# Patient Record
Sex: MALE | Race: WHITE | NOT HISPANIC OR LATINO | Employment: OTHER | URBAN - METROPOLITAN AREA
[De-identification: names, ages, dates, MRNs, and addresses within clinical notes are randomized per-mention and may not be internally consistent; named-entity substitution may affect disease eponyms.]

---

## 2017-01-13 ENCOUNTER — TELEPHONE (OUTPATIENT)
Dept: UROLOGY | Facility: CLINIC | Age: 52
End: 2017-01-13

## 2017-01-13 NOTE — TELEPHONE ENCOUNTER
Spoke with pharmacy . Pt insurance will pay for the androgel 1.62% two pumps a day. Rx verbally changed ok per Shruthi Corey NP

## 2017-04-12 ENCOUNTER — OFFICE VISIT (OUTPATIENT)
Dept: UROLOGY | Facility: CLINIC | Age: 52
End: 2017-04-12

## 2017-04-12 ENCOUNTER — TELEPHONE (OUTPATIENT)
Dept: UROLOGY | Facility: CLINIC | Age: 52
End: 2017-04-12

## 2017-04-12 ENCOUNTER — PATIENT MESSAGE (OUTPATIENT)
Dept: UROLOGY | Facility: CLINIC | Age: 52
End: 2017-04-12

## 2017-04-12 VITALS
DIASTOLIC BLOOD PRESSURE: 98 MMHG | WEIGHT: 183 LBS | HEART RATE: 83 BPM | SYSTOLIC BLOOD PRESSURE: 149 MMHG | HEIGHT: 69 IN | BODY MASS INDEX: 27.11 KG/M2

## 2017-04-12 DIAGNOSIS — E29.1 HYPOGONADISM IN MALE: Primary | ICD-10-CM

## 2017-04-12 DIAGNOSIS — E29.1 HYPOGONADISM IN MALE: ICD-10-CM

## 2017-04-12 PROCEDURE — 99999 PR PBB SHADOW E&M-EST. PATIENT-LVL III: CPT | Mod: PBBFAC,,, | Performed by: UROLOGY

## 2017-04-12 PROCEDURE — 99214 OFFICE O/P EST MOD 30 MIN: CPT | Mod: S$PBB,,, | Performed by: UROLOGY

## 2017-04-12 PROCEDURE — 99213 OFFICE O/P EST LOW 20 MIN: CPT | Mod: PBBFAC | Performed by: UROLOGY

## 2017-04-12 RX ORDER — TESTOSTERONE 16.2 MG/G
GEL TRANSDERMAL
COMMUNITY
Start: 2017-04-04 | End: 2017-04-17

## 2017-04-12 RX ORDER — TESTOSTERONE GEL, 1% 10 MG/G
5 GEL TRANSDERMAL DAILY
Qty: 150 G | Refills: 5 | Status: SHIPPED | OUTPATIENT
Start: 2017-04-12 | End: 2017-04-12 | Stop reason: SDUPTHER

## 2017-04-12 RX ORDER — OXYCODONE AND ACETAMINOPHEN 10; 325 MG/1; MG/1
TABLET ORAL
COMMUNITY
Start: 2017-04-10

## 2017-04-12 RX ORDER — TESTOSTERONE GEL, 1% 10 MG/G
5 GEL TRANSDERMAL DAILY
Qty: 150 G | Refills: 5 | Status: SHIPPED | OUTPATIENT
Start: 2017-04-12 | End: 2017-04-17

## 2017-04-12 RX ORDER — 1.1% SODIUM FLUORIDE 11 MG/G
GEL DENTAL
COMMUNITY
Start: 2017-03-24

## 2017-04-12 RX ORDER — BACLOFEN 10 MG/1
TABLET ORAL
COMMUNITY
Start: 2017-04-10

## 2017-04-12 NOTE — PROGRESS NOTES
CHIEF COMPLAINT:    Mr. Ayers is a 51 y.o. male presenting for follow up regarding low testosterone.    PRESENTING ILLNESS:    Feliciano Ayers is a 51 y.o. male with complaint of low testosterone. Starting 2.5 years ago he began having symptoms of fatigue and decreased libido. He also noticed that he was loosing muscle mass and fat was building around his abdomen. He denies problems with erections. Endocrine workup reveals testosterone levels of 270 - 361 over the last year. He has tried testosterone injections. He states he was instructed to inject 0.25 milliliter(s) every 2 weeks, titrated to 0.50 milliliter (200mg/ml) .  He has been on androgel for 6 months.     He complains of a weak stream and straining to empty his bladder, stable.  Denies nocturia, dysuria, frequency, urgency, hematuria. No stone history.     No family history of prostate, bladder, or kidney cancer.     REVIEW OF SYSTEMS:    Patient denies bleeding diathesis, chills, dysuria, fever, flank pain, frequency or urgency, hematuria, nocturia, stones, stress or urgency incontinence, TB or genitourinary trauma and urethral discharge. Denies any history of headache, blurred vision, fever, nausea, vomiting, chills, abdominal pain, bleeding per rectum, cough, SOB, recent loss of consciousness, recent mental status changes, seizures, dizziness, or upper or lower extremity weakness.      PATIENT HISTORY:    Past Medical History:   Diagnosis Date    Hypertension     Low testosterone        Past Surgical History:   Procedure Laterality Date    carpal tunnel      bilateral    TONSILLECTOMY      VASECTOMY         Family History   Problem Relation Age of Onset    Cancer Mother      breast       Social History     Social History    Marital status:      Spouse name: N/A    Number of children: N/A    Years of education: N/A     Occupational History    Not on file.     Social History Main Topics    Smoking status: Former Smoker    Smokeless  tobacco: Not on file      Comment: quit 2007    Alcohol use No    Drug use: No    Sexual activity: Not on file     Other Topics Concern    Not on file     Social History Narrative       Allergies:  Review of patient's allergies indicates no known allergies.    Medications:    Current Outpatient Prescriptions:     ANDROGEL 20.25 mg/1.25 gram (1.62 %) GlPm, , Disp: , Rfl:     baclofen (LIORESAL) 10 MG tablet, , Disp: , Rfl:     cyclobenzaprine (FLEXERIL) 10 MG tablet, one every IN THE EVENING, Disp: , Rfl: 2    hydrocodone-acetaminophen 10-325mg (NORCO)  mg Tab, Take 1 tablet by mouth 4 (four) times daily., Disp: , Rfl: 0    lisinopril (PRINIVIL,ZESTRIL) 20 MG tablet, , Disp: , Rfl:     oxycodone-acetaminophen (PERCOCET)  mg per tablet, , Disp: , Rfl:     SF 1.1 % Gel, , Disp: , Rfl:     PHYSICAL EXAMINATION:    The patient generally appears in good health, is appropriately interactive, and is in no apparent distress.     Eyes: anicteric sclerae, moist conjunctivae; no lid-lag; PERRLA     HENT: Atraumatic; oropharynx clear with moist mucous membranes and no mucosal ulcerations;normal hard and soft palate.  No evidence of lymphadenopathy.    Neck: Trachea midline.  No thyromegaly.    Musculoskeletal: No abnormal gait.    Skin: No lesions.    Mental: Cooperative with normal affect.  Is oriented to time, place, and person.    Neuro: Grossly intact.    Chest: Normal inspiratory effort.   No accessory muscles.  No audible wheezes.  Respirations symmetric on inspiration and expiration.    Heart: Regular rate.    Abdomen:  Soft, non-tender. Bladder is not palpable. No evidence of flank discomfort. No evidence of inguinal hernia.    Genitourinary: The penis is circumcised with no evidence of plaques or induration. The urethral meatus is normal. The testes, epididymides, and cord structures are normal in size and contour bilaterally. The scrotum is normal in size and contour.    Normal anal sphincter  tone. No rectal mass.    The prostate is 45 g. Normal landmarks. Lateral sulci. Median furrow intact.  No nodularity or induration. Seminal vesicles are normal.    Extremities: No clubbing, cyanosis, or edema      LABS:  IMPRESSION:    Encounter Diagnoses   Name Primary?    Hypogonadism in male Yes         PLAN:  1. Labs today to determine what is next.   2. Continue current testosterone dose depending on levels.

## 2017-04-12 NOTE — TELEPHONE ENCOUNTER
Can you write a script refill for the androgel and print it and give to [facility], so patient can pick it up?

## 2017-04-15 ENCOUNTER — PATIENT MESSAGE (OUTPATIENT)
Dept: UROLOGY | Facility: CLINIC | Age: 52
End: 2017-04-15

## 2017-04-17 ENCOUNTER — PATIENT MESSAGE (OUTPATIENT)
Dept: UROLOGY | Facility: CLINIC | Age: 52
End: 2017-04-17

## 2017-04-17 RX ORDER — TESTOSTERONE 20.25 MG/1.25G
2 GEL TOPICAL DAILY
Qty: 75 G | Refills: 5 | Status: SHIPPED | OUTPATIENT
Start: 2017-04-17 | End: 2017-04-25 | Stop reason: SDUPTHER

## 2017-04-19 ENCOUNTER — PATIENT MESSAGE (OUTPATIENT)
Dept: UROLOGY | Facility: CLINIC | Age: 52
End: 2017-04-19

## 2017-04-24 ENCOUNTER — PATIENT MESSAGE (OUTPATIENT)
Dept: UROLOGY | Facility: CLINIC | Age: 52
End: 2017-04-24

## 2017-04-25 RX ORDER — TESTOSTERONE 20.25 MG/1.25G
2 GEL TOPICAL DAILY
Qty: 75 G | Refills: 5 | Status: SHIPPED | OUTPATIENT
Start: 2017-04-25